# Patient Record
Sex: FEMALE
[De-identification: names, ages, dates, MRNs, and addresses within clinical notes are randomized per-mention and may not be internally consistent; named-entity substitution may affect disease eponyms.]

---

## 2021-04-04 ENCOUNTER — NURSE TRIAGE (OUTPATIENT)
Dept: OTHER | Facility: CLINIC | Age: 36
End: 2021-04-04

## 2021-04-04 NOTE — TELEPHONE ENCOUNTER
Reason for Disposition   [1] MODERATE weakness (i.e., interferes with work, school, normal activities) AND [2] cause unknown  (Exceptions: weakness with acute minor illness, or weakness from poor fluid intake)    Answer Assessment - Initial Assessment Questions  1. COVID-19 DIAGNOSIS: \"Who made your Coronavirus (COVID-19) diagnosis? \" \"Was it confirmed by a positive lab test?\" If not diagnosed by a HCP, ask \"Are there lots of cases (community spread) where you live? \" (See public health department website, if unsure)      Not tested. 2. COVID-19 EXPOSURE: \"Was there any known exposure to COVID before the symptoms began? \" CDC Definition of close contact: within 6 feet (2 meters) for a total of 15 minutes or more over a 24-hour period. 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 3. ONSET: \"When did the COVID-19 symptoms start? \"       Began 4 days ago. 4. WORST SYMPTOM: \"What is your worst symptom? \" (e.g., cough, fever, shortness of breath, muscle aches)      Chills, fever and loss of appetite. .   Not eating anything. Wants to eat, but can't - feels nauseated. 5. COUGH: \"Do you have a cough? \" If so, ask: \"How bad is the cough? \"        Slight cough/    6. FEVER: \"Do you have a fever? \" If so, ask: \"What is your temperature, how was it measured, and when did it start? \"      Denies - but feels feverish. 7. RESPIRATORY STATUS: \"Describe your breathing? \" (e.g., shortness of breath, wheezing, unable to speak)      Denies. 8. BETTER-SAME-WORSE: \"Are you getting better, staying the same or getting worse compared to yesterday? \"  If getting worse, ask, \"In what way? \"      Same. 9. HIGH RISK DISEASE: \"Do you have any chronic medical problems? \" (e.g., asthma, heart or lung disease, weak immune system, obesity, etc.)      Denies. 10. PREGNANCY: \"Is there any chance you are pregnant? \" \"When was your last menstrual period? \"        Denies. Tubes tied.     11. OTHER SYMPTOMS: \"Do you have any other symptoms? \"  (e.g., chills, fatigue, headache, loss of smell or taste, muscle pain, sore throat; new loss of smell or taste especially support the diagnosis of COVID-19)        Fever chills loss of smell and taste, fatigue  Headache - rates pain 3/10. Protocols used: WEAKNESS (GENERALIZED) AND FATIGUE-ADULT-AH, CORONAVIRUS (COVID-19) DIAGNOSED OR SUSPECTED-ADULT-AH    Call received on [address]. Brief description of triage: COVID symptoms. Not tested yet. Triage indicates for patient to See provider in next 24 hours. Care advice provided. Recommended using local department of health website for testing locations and up to date information. Caller verbalizes understanding, denies any other questions or concerns; instructed to call back for any new or worsening symptoms. Provided the [facility] 24/7 connor and coupon code. I have reviewed and confirmed nurses' notes...